# Patient Record
Sex: FEMALE | Race: WHITE | Employment: UNEMPLOYED | URBAN - METROPOLITAN AREA
[De-identification: names, ages, dates, MRNs, and addresses within clinical notes are randomized per-mention and may not be internally consistent; named-entity substitution may affect disease eponyms.]

---

## 2022-01-19 ENCOUNTER — HOSPITAL ENCOUNTER (EMERGENCY)
Age: 22
Discharge: HOME | End: 2022-01-19
Attending: EMERGENCY MEDICINE
Payer: MEDICARE

## 2022-01-19 VITALS
RESPIRATION RATE: 18 BRPM | HEART RATE: 109 BPM | SYSTOLIC BLOOD PRESSURE: 121 MMHG | OXYGEN SATURATION: 97 % | WEIGHT: 170 LBS | HEIGHT: 65 IN | TEMPERATURE: 98.1 F | BODY MASS INDEX: 28.32 KG/M2 | DIASTOLIC BLOOD PRESSURE: 68 MMHG

## 2022-01-19 DIAGNOSIS — U07.1 COVID-19: Primary | ICD-10-CM

## 2022-01-19 LAB
SARS-COV-2, RAPID: DETECTED
SPECIMEN DESCRIPTION: ABNORMAL

## 2022-01-19 PROCEDURE — 87635 SARS-COV-2 COVID-19 AMP PRB: CPT

## 2022-01-19 PROCEDURE — 99283 EMERGENCY DEPT VISIT LOW MDM: CPT

## 2022-01-19 NOTE — ED PROVIDER NOTES
[address]  eMERGENCY dEPARTMENT eNCOUnter      Pt Name: Jerry Martel  MRN: [id number]  [place] 2000  Date of evaluation: 1/19/2022  Provider: Raj Aase, PA-C    CHIEF COMPLAINT       Chief Complaint   Patient presents with    Covid Testing           HISTORY OF PRESENT ILLNESS  (Location/Symptom, Timing/Onset, Context/Setting, Quality, Duration, Modifying Factors, Severity.)   Jerry Martel is a 24 y.o. female who presents to the emergency department for covid testing. Pt states her cousin has covid and she was recently around her. Pt reports headache, cough and body aches x this morning. Denies fever, chills, chest pain, sob, nausea, emesis, abd pain. Pt is not vaccinated. No other complaints. Nursing Notes were reviewed. REVIEW OF SYSTEMS    (2-9 systems for level 4, 10 or more for level 5)     Review of Systems   Headache  Body aches  Cough       Except as noted above the remainder of the review of systems was reviewed and negative. PAST MEDICAL HISTORY   History reviewed. No pertinent past medical history. None otherwise stated in nurses notes    SURGICAL HISTORY       Past Surgical History:   Procedure Laterality Date    TONSILLECTOMY       None otherwise stated in nurses notes    CURRENT MEDICATIONS       Previous Medications    No medications on file       ALLERGIES     Patient has no known allergies. FAMILY HISTORY     History reviewed. No pertinent family history. No family status information on file. None otherwise stated in nurses notes    SOCIAL HISTORY      reports that she has been smoking cigarettes and e-cigarettes.  She has never used smokeless tobacco.   lives at home with others     PHYSICAL EXAM    (up to 7 for level 4, 8 or more for level 5)     ED Triage Vitals [01/19/22 1700]   BP Temp Temp Source Pulse Resp SpO2 Height Weight   121/68 98.1 °F (36.7 °C) Oral 109 18 97 % 5' 5\" (1.651 m) 170 lb (77.1 kg)       Physical Exam   Nursing note and vitals reviewed. Constitutional: well-developed, well-nourished, nontoxic, well appearing, not distressed  HEENT:  normocephalic atraumatic, external ears normal appearance, no nasal deformity, no neck masses or edema, patient protecting airway, no stridor, phonating well  Eyes: pupils equal, sclera non-icteric, no discharge  Cardiovascular: no JVD  Respiratory: non-labored breathing, effort normal, no accessory muscle use visulized, no audible wheezing  Gastrointestinal: Abdomen not distended  Musculoskeletal: moves extremities without impaired range of motion, no deformity, no edema  Skin: no pallor, no rashes visible  Neuro: alert and oriented times 3, GCS 15, normal coordination, no dysarthria or aphasia  Psych: normal mood and affect, cooperative, normal thought content            DIAGNOSTIC RESULTS     EKG: All EKG's are interpreted by the Emergency Department Physician who either signs or Co-signs this chart in the absence of a cardiologist.        RADIOLOGY:   All plain film, CT, MRI, and formal ultrasound images (except ED bedside ultrasound) are read by the radiologist, see reports below, unless otherwise noted in MDM or here. No orders to display       No results found. LABS:  Labs Reviewed   COVID-19, RAPID - Abnormal; Notable for the following components:       Result Value    SARS-CoV-2, Rapid DETECTED (*)     All other components within normal limits       All other labs were within normal range or not returned as of this dictation.     EMERGENCY DEPARTMENT COURSE and DIFFERENTIAL DIAGNOSIS/MDM:   Vitals:    Vitals:    01/19/22 1700   BP: 121/68   Pulse: 109   Resp: 18   Temp: 98.1 °F (36.7 °C)   TempSrc: Oral   SpO2: 97%   Weight: 170 lb (77.1 kg)   Height: 5' 5\" (1.651 m)         Patient instructed to return to the emergency room if symptoms worsen, return, or any other concern right away which is agreed by the patient    ED MEDS:  No orders of the defined types were placed in this encounter. CONSULTS:  None    PROCEDURES:  None      FINAL IMPRESSION      1. COVID-19          DISPOSITION/PLAN   DISPOSITION Decision To Discharge    PATIENT REFERRED TO:  [facility]  [address]  [address] [address] [phone]  Call       [facility] ED  [address]  [address] [address]  [phone]          DISCHARGE MEDICATIONS:  New Prescriptions    No medications on file         Summation      Patient Course:    Headache, body ahces, cough x this morning. Positive for covid. Well appearing. No distress. The patient's signs and symptoms are consistent with an acute mild viral illness. At this time there is significant evidence of Covid-19 community spread due to this pandemic, and I feel the patient most likely has mild Covid-19 or other viral illness. The patient is nontoxic and well appearing, no evidence of hypoxia or impending respiratory failure. The patient is tolerating PO. I do not feel the patient has evidence of significant dehydration or end organ failure at this time. The patient does not have risk factors for severe disease such as cardiovascular disease, hypertension, uncontrolled diabetes, chronic respiratory disease, underlying malignancy, immunocompromised, Age > 60 years. I do not feel the patient has an emergent medical condition at this time. At this time there is a critical shortage of SARS-Coronavirus-2 PCR testing, very limited criteria for testing, and we are unable to test the patient at this time since criteria is not met. Direct patient contact is avoided at this time due to the critically low supplies of PPE worldwide and an effort to erin appropriate use of PPE. I performed a medical screening exam that is compliant with the Declaration of [place] Emergency in the [place], secondary to the Pandemic Infectious Disease of SARS-Coronavirus-2.      We are not testing for influenza or other viral

## 2022-01-19 NOTE — ED NOTES
Patient discharged alert and oriented. Skin pink, warm, dry. RN discussed, educated, and counseled on care plan. Denies needs or questions at this time. States will follow up as directed. Encouraged to return for worsening or new symptoms.        Randy Byers RN  01/19/22 7731

## 2022-01-20 NOTE — ED PROVIDER NOTES
[address]  eMERGENCY dEPARTMENT eNCOUnter   Independent Attestation     Pt Name: Phoenix Dixon  MRN: [id number]  [place] 2000  Date of evaluation: 1/19/22   Phoenix Dixon is a 24 y.o. female who presents with Covid Testing    Vitals:   Vitals:    01/19/22 1700   BP: 121/68   Pulse: 109   Resp: 18   Temp: 98.1 °F (36.7 °C)   TempSrc: Oral   SpO2: 97%   Weight: 170 lb (77.1 kg)   Height: 5' 5\" (1.651 m)     Impression:   1. COVID-19      I was personally available for consultation in the Emergency Department. I have reviewed the chart and agree with the documentation as recorded by the [facility], including the assessment, treatment plan and disposition.   Leopoldo Ken, MD  Attending Emergency  Physician                 Leopoldo Ken, MD  01/19/22 7214

## 2024-10-30 ENCOUNTER — HOSPITAL ENCOUNTER (EMERGENCY)
Age: 24
Discharge: HOME | End: 2024-10-30
Attending: EMERGENCY MEDICINE
Payer: MEDICAID

## 2024-10-30 VITALS
WEIGHT: 149 LBS | DIASTOLIC BLOOD PRESSURE: 80 MMHG | OXYGEN SATURATION: 99 % | RESPIRATION RATE: 17 BRPM | HEIGHT: 64 IN | SYSTOLIC BLOOD PRESSURE: 123 MMHG | BODY MASS INDEX: 25.44 KG/M2 | HEART RATE: 97 BPM | TEMPERATURE: 98.8 F

## 2024-10-30 DIAGNOSIS — K04.7 DENTAL INFECTION: Primary | ICD-10-CM

## 2024-10-30 PROCEDURE — 99283 EMERGENCY DEPT VISIT LOW MDM: CPT

## 2024-10-30 RX ORDER — PENICILLIN V POTASSIUM 500 MG/1
500 TABLET, FILM COATED ORAL 4 TIMES DAILY
Qty: 28 TABLET | Refills: 0 | Status: SHIPPED | OUTPATIENT
Start: 2024-10-30 | End: 2024-11-06

## 2024-10-30 RX ORDER — IBUPROFEN 600 MG/1
600 TABLET, FILM COATED ORAL EVERY 8 HOURS PRN
Qty: 120 TABLET | Refills: 0 | Status: SHIPPED | OUTPATIENT
Start: 2024-10-30

## 2024-10-30 ASSESSMENT — PAIN DESCRIPTION - LOCATION: LOCATION: TEETH

## 2024-10-30 ASSESSMENT — PAIN SCALES - GENERAL: PAINLEVEL_OUTOF10: 10

## 2024-10-30 ASSESSMENT — PAIN - FUNCTIONAL ASSESSMENT: PAIN_FUNCTIONAL_ASSESSMENT: 0-10

## 2024-10-30 ASSESSMENT — PAIN DESCRIPTION - PAIN TYPE: TYPE: CHRONIC PAIN

## 2024-10-30 ASSESSMENT — PAIN DESCRIPTION - ORIENTATION: ORIENTATION: LEFT

## 2024-10-30 ASSESSMENT — PAIN DESCRIPTION - FREQUENCY: FREQUENCY: CONTINUOUS

## 2024-10-31 NOTE — ED PROVIDER NOTES
[facility] ED  EMERGENCY DEPARTMENT ENCOUNTER      Pt Name: Raffaele Ware  MRN: [id number]  Birthdate 2000  Date of evaluation: 10/30/24      CHIEF COMPLAINT       Chief Complaint   Patient presents with    Dental Pain     Left side         HISTORY OF PRESENT ILLNESS   HPI 24 y.o. female presents with c/o tooth pain.  Symptoms started about 2 weeks ago.  Pain is described as throbbing in character, severe in severity (rating it 10 / 10).  The pain is located primarily in the r. Lower molar area, with  radiation to her jaw.  The pain has been constant in course.  The patient tried aleve prior to arrival with minimal relief of symptoms.    REVIEW OF SYSTEMS     Constitution: No fever  HENT: + Dental Pain  Pulmonary: No shortness of breath  GI: No vomiting  Neurologic: No headache    PAST MEDICAL HISTORY   No past medical history on file.    SURGICAL HISTORY       Past Surgical History:   Procedure Laterality Date    TONSILLECTOMY         CURRENT MEDICATIONS       Previous Medications    No medications on file       ALLERGIES     has No Known Allergies.    FAMILY HISTORY     has no family status information on file.        SOCIAL HISTORY      reports that she has been smoking cigarettes and e-cigarettes. She has never used smokeless tobacco.    PHYSICAL EXAM     INITIAL VITALS: /80   Pulse 97   Temp 98.8 °F (37.1 °C) (Oral)   Resp 17   Ht 1.626 m (5' 4\")   Wt 67.6 kg (149 lb)   LMP 10/29/2024 (Approximate)   SpO2 99%   BMI 25.58 kg/m²     General: NAD  Head: NCAT  Face: No edema  ENT: Dental emmy.  There is no large periapical abscess that would require bedside drainage.  There is tenderness to palpation over the tooth.  Neck: There is no erythema no induration no adenopathy no stridor  Cardiovascular: RRR  Pulmonary: CTA  Neuro: Patient's alert and oriented x3 fluent speech ambulatory with a normal gait    MEDICAL DECISION MAKING:     MDM      This is a 24 y.o. female with dental pain most  2054